# Patient Record
(demographics unavailable — no encounter records)

---

## 2025-04-21 NOTE — HISTORY OF PRESENT ILLNESS
[de-identified] : Patient is a 68-year-old male here for evaluation of right knee.  Patient's states he started having pain about 2 weeks ago without any injury or trauma.  Patient states he is very active plays golf and pickleball.  Denies recent injury or trauma, denies numbness tingling, denies instability.  Patient states that he does have history of arthroscopic meniscus surgery about 5 years ago.  Right knee exam: No effusion no ecchymosis no erythema mild tenderness to medial joint line range of motion 0 degrees 220 degrees of mild discomfort no gross instability neurovascular intact calf soft nontender negative Homans  X-ray right knee 3 views in office today: No acute fractures, subluxations, dislocations.  Moderate tricompartmental osteoarthritis  Discussed with patient detail that he has osteoarthritis flareup right knee.  Discussed treatment options in detail.  Plan is for conservative treatment including activity modification, home physical therapy exercises, Motrin/Tylenol as needed for pain.  Plan today is for cortisone injection right knee  Dexamethasone and Lidocaine      Anesthesia: ethyl chloride sprayed topically.    Dexamethasone 10mg/mL 2 cc   Lidocaine 1% 3cc         Medication was injected in the right knee after verbal consent using sterile preparation and technique. The risks, benefits, and alternatives to cortisone injection were explained in full to the patient. Risks outlined include but are not limited to infection, sepsis, bleeding, scarring, skin discoloration, temporary increase in pain, syncopal episode, failure to resolve symptoms, allergic reaction, symptom recurrence, and elevation of blood sugar in diabetics. Patient understood the risks. All questions were answered. After discussion of options, the patient requested an injection. Oral informed consent was obtained and sterile prep was done of the injection site. Sterile technique was utilized for the procedure including the preparation of the solutions used for the injection. Patient tolerated the procedure well. Advised to ice the injection site this evening. Follow-up in 3 to 4 months for reevaluation call if symptoms worsen.

## 2025-05-29 NOTE — ASSESSMENT
[FreeTextEntry1] : Placed him on diclofenac at this time monitor the shoulder or for an additional cortisone injection before going to [place] we may consider gel I will see him back in a couple months  the option of a  cortisone injection in the right   knee was discussed with the patient today.  risks and benefits were reviewed and  consent was given.  with sterile technique  through a lateral approach 5 cc dexamethasone (20 mg) and 5 cc 1%  lidocaine was infiltrated with good effect and a  Band-Aid applied ice was  recommended

## 2025-05-29 NOTE — REASON FOR VISIT
[FreeTextEntry2] : right knee and left shoulder right knee arthroscopy 1/26/2018 for medial meniscus tear had a cortisone injection last visit helped about a week been taking just some Advil has a planned trip to golf in [place] this all happened after a week of playing some pickle ball and golf reports history of a left knee arthroscopy on around 2013 right shoulder giving him some pain sustained a  shoulder about 25 years ago

## 2025-05-29 NOTE — REASON FOR VISIT
[FreeTextEntry2] : right knee and left shoulder right knee arthroscopy 1/26/2018 for medial meniscus tear had a cortisone injection last visit helped about a week been taking just some Advil has a planned trip to golf in [place] this all happened after a week of playing some pickle ball and golf reports history of a left knee arthroscopy on around 2013 right shoulder giving him some pain sustained a  shoulder about 25 years ago

## 2025-05-29 NOTE — HISTORY OF PRESENT ILLNESS
[de-identified] : Patient is a 68-year-old male here for evaluation of right knee.  Patient's states he started having pain about 2 weeks ago without any injury or trauma.  Patient states he is very active plays golf and pickleball.  Denies recent injury or trauma, denies numbness tingling, denies instability.  Patient states that he does have history of arthroscopic meniscus surgery about 5 years ago.  Right knee exam: No effusion no ecchymosis no erythema mild tenderness to medial joint line range of motion 0 degrees 220 degrees of mild discomfort no gross instability neurovascular intact calf soft nontender negative Homans  X-ray right knee 3 views in office today: No acute fractures, subluxations, dislocations.  Moderate tricompartmental osteoarthritis  Right shoulder prominence over AC joint some diffuse tenderness  X-ray right shoulder grade 3 AC separation chronic

## 2025-05-29 NOTE — ASSESSMENT
[FreeTextEntry1] : Placed him on diclofenac at this time monitor the shoulder or for an additional cortisone injection before going to [place] we may consider gel I will see him back in a couple months  the option of a  cortisone injection in the right   knee was discussed with the patient today.  risks and benefits were reviewed and  consent was given.  with sterile technique  through a lateral approach 5 cc dexamethasone (20 mg) and 5 cc 1%  lidocaine was infiltrated with good effect and a  Band-Aid applied ice was  recommended

## 2025-05-29 NOTE — HISTORY OF PRESENT ILLNESS
[de-identified] : Patient is a 68-year-old male here for evaluation of right knee.  Patient's states he started having pain about 2 weeks ago without any injury or trauma.  Patient states he is very active plays golf and pickleball.  Denies recent injury or trauma, denies numbness tingling, denies instability.  Patient states that he does have history of arthroscopic meniscus surgery about 5 years ago.  Right knee exam: No effusion no ecchymosis no erythema mild tenderness to medial joint line range of motion 0 degrees 220 degrees of mild discomfort no gross instability neurovascular intact calf soft nontender negative Homans  X-ray right knee 3 views in office today: No acute fractures, subluxations, dislocations.  Moderate tricompartmental osteoarthritis  Right shoulder prominence over AC joint some diffuse tenderness  X-ray right shoulder grade 3 AC separation chronic

## 2025-07-03 NOTE — REASON FOR VISIT
[FreeTextEntry2] : right knee left shoulder Knee pain much better with the cortisone injection and taking diclofenac when on his golf trip medicine did not help the shoulder as much still some pain at night

## 2025-07-03 NOTE — ASSESSMENT
[FreeTextEntry1] : Placed him on diclofenac now try qD at this time monitor the shoulder or for a shoulder cortisone injection will get shoulder MRI  3 month FU

## 2025-07-03 NOTE — HISTORY OF PRESENT ILLNESS
[de-identified] : Patient is a 68-year-old male here for evaluation of right knee.  Patient's states he started having pain about 2 weeks ago without any injury or trauma.  Patient states he is very active plays golf and pickleball.  Denies recent injury or trauma, denies numbness tingling, denies instability.  Patient states that he does have history of arthroscopic meniscus surgery about 5 years ago.  Right knee exam: No effusion no ecchymosis no erythema mild tenderness to medial joint line range of motion 0 degrees 220 degrees of mild discomfort no gross instability neurovascular intact calf soft nontender negative Homans  X-ray right knee 3 views in office today: No acute fractures, subluxations, dislocations.  Moderate tricompartmental osteoarthritis  Right shoulder prominence over AC joint some diffuse tenderness  X-ray right shoulder grade 3 AC separation chronic

## 2025-07-03 NOTE — HISTORY OF PRESENT ILLNESS
[de-identified] : Patient is a 68-year-old male here for evaluation of right knee.  Patient's states he started having pain about 2 weeks ago without any injury or trauma.  Patient states he is very active plays golf and pickleball.  Denies recent injury or trauma, denies numbness tingling, denies instability.  Patient states that he does have history of arthroscopic meniscus surgery about 5 years ago.  Right knee exam: No effusion no ecchymosis no erythema mild tenderness to medial joint line range of motion 0 degrees 220 degrees of mild discomfort no gross instability neurovascular intact calf soft nontender negative Homans  X-ray right knee 3 views in office today: No acute fractures, subluxations, dislocations.  Moderate tricompartmental osteoarthritis  Right shoulder prominence over AC joint some diffuse tenderness  X-ray right shoulder grade 3 AC separation chronic